# Patient Record
Sex: MALE | Race: WHITE | Employment: OTHER | URBAN - METROPOLITAN AREA
[De-identification: names, ages, dates, MRNs, and addresses within clinical notes are randomized per-mention and may not be internally consistent; named-entity substitution may affect disease eponyms.]

---

## 2021-03-15 DIAGNOSIS — Z23 NEED FOR VACCINATION: ICD-10-CM

## 2023-06-28 ENCOUNTER — OFFICE VISIT (OUTPATIENT)
Dept: URGENT CARE | Facility: PHYSICIAN GROUP | Age: 63
End: 2023-06-28
Payer: COMMERCIAL

## 2023-06-28 VITALS
SYSTOLIC BLOOD PRESSURE: 140 MMHG | HEART RATE: 98 BPM | TEMPERATURE: 98.2 F | RESPIRATION RATE: 16 BRPM | OXYGEN SATURATION: 96 % | HEIGHT: 71 IN | WEIGHT: 208 LBS | BODY MASS INDEX: 29.12 KG/M2 | DIASTOLIC BLOOD PRESSURE: 100 MMHG

## 2023-06-28 DIAGNOSIS — I11.9 BENIGN HYPERTENSIVE HEART DISEASE WITHOUT HEART FAILURE: ICD-10-CM

## 2023-06-28 DIAGNOSIS — Z86.79 HISTORY OF ATRIAL FIBRILLATION: ICD-10-CM

## 2023-06-28 DIAGNOSIS — J45.20 INTERMITTENT ASTHMA WITHOUT COMPLICATION, UNSPECIFIED ASTHMA SEVERITY: ICD-10-CM

## 2023-06-28 PROCEDURE — 3077F SYST BP >= 140 MM HG: CPT

## 2023-06-28 PROCEDURE — 93000 ELECTROCARDIOGRAM COMPLETE: CPT

## 2023-06-28 PROCEDURE — 99214 OFFICE O/P EST MOD 30 MIN: CPT

## 2023-06-28 PROCEDURE — 3080F DIAST BP >= 90 MM HG: CPT

## 2023-06-28 RX ORDER — METOPROLOL SUCCINATE 100 MG/1
100 TABLET, EXTENDED RELEASE ORAL DAILY
Qty: 30 TABLET | Refills: 0 | Status: SHIPPED | OUTPATIENT
Start: 2023-06-28 | End: 2023-07-05

## 2023-06-28 RX ORDER — ALBUTEROL SULFATE 90 UG/1
2 AEROSOL, METERED RESPIRATORY (INHALATION) EVERY 6 HOURS PRN
Qty: 8.5 G | Refills: 0 | Status: SHIPPED | OUTPATIENT
Start: 2023-06-28

## 2023-06-28 ASSESSMENT — ENCOUNTER SYMPTOMS
COUGH: 0
WEIGHT LOSS: 0
DIAPHORESIS: 0
PND: 0
FEVER: 0
ORTHOPNEA: 0
CLAUDICATION: 0
PALPITATIONS: 0
CHILLS: 0

## 2023-06-28 NOTE — PATIENT INSTRUCTIONS
Follow up with PCP in 2 weeks  Daily AM and PM blood pressure and heart rate monitoring  Blood pressure and heart rate log  Bring log to PCP

## 2023-06-28 NOTE — PROGRESS NOTES
"Subjective:     Eitan Braswell is a 63 y.o. male who presents for Medication Refill (Need blood pressure refill)    HPI    The patient  endorses that he went to the dentist for tooth extractions and was told that his blood pressure was too high to have the procedure. He has a past medical history of hypertension and atrial fibrillation. He had a PCP was was managing his hypertension, but endorses that he has not seen a PCP in over 18 months and has been non-compliant with is BP medications. He presents to the urgent care for BP medication refill. Pertinent negatives include no dizziness, lightheadedness, SOB, orthopnea, palpitations, PND, cough, claudication, leg swelling, chest pain, headache, changes in vision.  He has an appointment with a new primary care provider on July 5.      Review of Systems   Constitutional:  Negative for chills, diaphoresis, fever, malaise/fatigue and weight loss.   Respiratory:  Negative for cough.    Cardiovascular:  Negative for chest pain, palpitations, orthopnea, claudication, leg swelling and PND.   All other systems reviewed and are negative.      PMH:   Past Medical History:   Diagnosis Date    ASTHMA     Atrial fibrillation (HCC) 4/4/2012    Benign hypertensive heart disease without heart failure 4/4/2012    Chest pain, unspecified 4/4/2012    Congestive heart failure (HCC)     afib, mi    Hypertension      ALLERGIES: No Known Allergies  SURGHX:   Past Surgical History:   Procedure Laterality Date    OTHER ORTHOPEDIC SURGERY      shoulder     SOCHX:   Social History     Socioeconomic History    Marital status:    Tobacco Use    Smoking status: Former   Substance and Sexual Activity    Alcohol use: No    Drug use: No     FH: History reviewed. No pertinent family history.      Objective:   BP (!) 140/100   Pulse 98   Temp 36.8 °C (98.2 °F) (Temporal)   Resp 16   Ht 1.803 m (5' 11\")   Wt 94.3 kg (208 lb)   SpO2 96%   BMI 29.01 kg/m²     Physical Exam  Vitals " reviewed.   Constitutional:       General: He is not in acute distress.     Appearance: Normal appearance. He is normal weight. He is not ill-appearing, toxic-appearing or diaphoretic.   HENT:      Head: Normocephalic and atraumatic.      Right Ear: Tympanic membrane, ear canal and external ear normal.      Left Ear: Tympanic membrane, ear canal and external ear normal.      Nose: Nose normal.      Mouth/Throat:      Mouth: Mucous membranes are moist.      Pharynx: Oropharynx is clear.   Eyes:      Extraocular Movements: Extraocular movements intact.      Conjunctiva/sclera: Conjunctivae normal.      Pupils: Pupils are equal, round, and reactive to light.   Cardiovascular:      Rate and Rhythm: Regular rhythm. Tachycardia present. Extrasystoles are present.     Chest Wall: PMI is not displaced. No thrill.      Pulses: Normal pulses. No decreased pulses.      Heart sounds: Normal heart sounds. Heart sounds not distant. No murmur heard.     No systolic murmur is present.      No diastolic murmur is present.      No friction rub. No gallop. No S3 or S4 sounds.   Pulmonary:      Effort: Pulmonary effort is normal. No respiratory distress.      Breath sounds: Normal breath sounds. No stridor. No wheezing, rhonchi or rales.   Abdominal:      General: Abdomen is flat. Bowel sounds are normal.      Palpations: Abdomen is soft.   Musculoskeletal:         General: Normal range of motion.      Cervical back: Normal range of motion and neck supple.      Right lower leg: No edema.      Left lower leg: No edema.   Skin:     General: Skin is warm and dry.   Neurological:      General: No focal deficit present.      Mental Status: He is alert and oriented to person, place, and time. Mental status is at baseline.   Psychiatric:         Mood and Affect: Mood normal.         Behavior: Behavior normal.         Thought Content: Thought content normal.         Judgment: Judgment normal.       EKG: NSR, ST rate: 105 , normal axis, normal  intervals, no evidence of new onset ischemia, possible LA enlargement     SEE SCANNED MEDIA    Assessment/Plan:   Assessment      1. Benign hypertensive heart disease without heart failure  - metoprolol SR (TOPROL XL) 100 MG TABLET SR 24 HR; Take 1 Tablet by mouth every day for 30 days. PLEASE SCHEDULE APPOINTMENT FOR FURTHER REFILLS.  Dispense: 30 Tablet; Refill: 0    2. History of atrial fibrillation  - EKG - Clinic Performed  - Referral to establish with [facility] PCP    3. Intermittent asthma without complication, unspecified asthma severity  - albuterol 108 (90 Base) MCG/ACT Aero Soln inhalation aerosol; Inhale 2 Puffs every 6 hours as needed for Shortness of Breath.  Dispense: 8.5 g; Refill: 0     EKG completed in clinic which demonstrated a normal sinus rhythm with a heart rate of 105.  Probable old infarct, which the patient endorses he has known about for many years.  Patient asymptomatic at this time. The patient is not in atrial fibrillation at the time of clinical encounter. Prescription for metoprolol refilled and sent to patient's preferred pharmacy.  Patient educated on hypertension management, including lifestyle modifications, regular healthcare maintenance, and medication compliance.  He will start a blood pressure and heart rate log prior to taking his BP medication and was educated to record a.m. and p.m. readings.  He will follow-up with his primary care provider on July 5, 2023.  ER precautions discussed in the event that patient has an episode of atrial fibrillation as he reports he experiences palpitations when he converts into this rhythm.  He is not anticoagulated nor does he take a low-dose baby aspirin daily.    Albuterol inhaler refilled. All questions answered. Patient verbalized understanding and is in agreement with this plan of care.     If symptoms are worsening or not improving in 3-5 days, follow-up with PCP or return to . Differential diagnosis, natural history, and supportive  care discussed. AVS handout given and reviewed with patient. Patient educated on red flags and when to seek treatment back in ED or UC.     I personally reviewed prior external notes and test results pertinent to today's visit.  I have independently reviewed and interpreted all diagnostics ordered during this urgent care visit.     This dictation has been created using voice recognition software. The accuracy of the dictation is limited by the abilities of the software. I expect there may be some errors of grammar and possibly content. I made every attempt to manually correct the errors within my dictation. However, errors related to voice recognition software may still exist and should be interpreted within the appropriate context.    This note was electronically signed by GASPER Rhoades

## 2023-07-02 SDOH — HEALTH STABILITY: PHYSICAL HEALTH: ON AVERAGE, HOW MANY DAYS PER WEEK DO YOU ENGAGE IN MODERATE TO STRENUOUS EXERCISE (LIKE A BRISK WALK)?: 4 DAYS

## 2023-07-02 SDOH — ECONOMIC STABILITY: TRANSPORTATION INSECURITY
IN THE PAST 12 MONTHS, HAS THE LACK OF TRANSPORTATION KEPT YOU FROM MEDICAL APPOINTMENTS OR FROM GETTING MEDICATIONS?: NO

## 2023-07-02 SDOH — ECONOMIC STABILITY: INCOME INSECURITY: IN THE LAST 12 MONTHS, WAS THERE A TIME WHEN YOU WERE NOT ABLE TO PAY THE MORTGAGE OR RENT ON TIME?: NO

## 2023-07-02 SDOH — ECONOMIC STABILITY: HOUSING INSECURITY: IN THE LAST 12 MONTHS, HOW MANY PLACES HAVE YOU LIVED?: 1

## 2023-07-02 SDOH — ECONOMIC STABILITY: FOOD INSECURITY: WITHIN THE PAST 12 MONTHS, YOU WORRIED THAT YOUR FOOD WOULD RUN OUT BEFORE YOU GOT MONEY TO BUY MORE.: NEVER TRUE

## 2023-07-02 SDOH — ECONOMIC STABILITY: TRANSPORTATION INSECURITY
IN THE PAST 12 MONTHS, HAS LACK OF TRANSPORTATION KEPT YOU FROM MEETINGS, WORK, OR FROM GETTING THINGS NEEDED FOR DAILY LIVING?: NO

## 2023-07-02 SDOH — ECONOMIC STABILITY: HOUSING INSECURITY
IN THE LAST 12 MONTHS, WAS THERE A TIME WHEN YOU DID NOT HAVE A STEADY PLACE TO SLEEP OR SLEPT IN A SHELTER (INCLUDING NOW)?: NO

## 2023-07-02 SDOH — HEALTH STABILITY: MENTAL HEALTH
STRESS IS WHEN SOMEONE FEELS TENSE, NERVOUS, ANXIOUS, OR CAN'T SLEEP AT NIGHT BECAUSE THEIR MIND IS TROUBLED. HOW STRESSED ARE YOU?: NOT AT ALL

## 2023-07-02 SDOH — ECONOMIC STABILITY: TRANSPORTATION INSECURITY
IN THE PAST 12 MONTHS, HAS LACK OF RELIABLE TRANSPORTATION KEPT YOU FROM MEDICAL APPOINTMENTS, MEETINGS, WORK OR FROM GETTING THINGS NEEDED FOR DAILY LIVING?: NO

## 2023-07-02 SDOH — HEALTH STABILITY: PHYSICAL HEALTH: ON AVERAGE, HOW MANY MINUTES DO YOU ENGAGE IN EXERCISE AT THIS LEVEL?: 60 MIN

## 2023-07-02 SDOH — ECONOMIC STABILITY: INCOME INSECURITY: HOW HARD IS IT FOR YOU TO PAY FOR THE VERY BASICS LIKE FOOD, HOUSING, MEDICAL CARE, AND HEATING?: NOT HARD AT ALL

## 2023-07-02 SDOH — ECONOMIC STABILITY: FOOD INSECURITY: WITHIN THE PAST 12 MONTHS, THE FOOD YOU BOUGHT JUST DIDN'T LAST AND YOU DIDN'T HAVE MONEY TO GET MORE.: NEVER TRUE

## 2023-07-02 ASSESSMENT — SOCIAL DETERMINANTS OF HEALTH (SDOH)
DO YOU BELONG TO ANY CLUBS OR ORGANIZATIONS SUCH AS CHURCH GROUPS UNIONS, FRATERNAL OR ATHLETIC GROUPS, OR SCHOOL GROUPS?: NO
HOW OFTEN DO YOU HAVE A DRINK CONTAINING ALCOHOL: NEVER
HOW OFTEN DO YOU ATTENT MEETINGS OF THE CLUB OR ORGANIZATION YOU BELONG TO?: NEVER
DO YOU BELONG TO ANY CLUBS OR ORGANIZATIONS SUCH AS CHURCH GROUPS UNIONS, FRATERNAL OR ATHLETIC GROUPS, OR SCHOOL GROUPS?: NO
HOW MANY DRINKS CONTAINING ALCOHOL DO YOU HAVE ON A TYPICAL DAY WHEN YOU ARE DRINKING: PATIENT DOES NOT DRINK
HOW OFTEN DO YOU GET TOGETHER WITH FRIENDS OR RELATIVES?: ONCE A WEEK
HOW HARD IS IT FOR YOU TO PAY FOR THE VERY BASICS LIKE FOOD, HOUSING, MEDICAL CARE, AND HEATING?: NOT HARD AT ALL
HOW OFTEN DO YOU ATTEND CHURCH OR RELIGIOUS SERVICES?: NEVER
HOW OFTEN DO YOU GET TOGETHER WITH FRIENDS OR RELATIVES?: ONCE A WEEK
IN A TYPICAL WEEK, HOW MANY TIMES DO YOU TALK ON THE PHONE WITH FAMILY, FRIENDS, OR NEIGHBORS?: THREE TIMES A WEEK
HOW OFTEN DO YOU ATTEND CHURCH OR RELIGIOUS SERVICES?: NEVER
HOW OFTEN DO YOU HAVE SIX OR MORE DRINKS ON ONE OCCASION: NEVER
HOW OFTEN DO YOU ATTENT MEETINGS OF THE CLUB OR ORGANIZATION YOU BELONG TO?: NEVER
IN A TYPICAL WEEK, HOW MANY TIMES DO YOU TALK ON THE PHONE WITH FAMILY, FRIENDS, OR NEIGHBORS?: THREE TIMES A WEEK
WITHIN THE PAST 12 MONTHS, YOU WORRIED THAT YOUR FOOD WOULD RUN OUT BEFORE YOU GOT THE MONEY TO BUY MORE: NEVER TRUE

## 2023-07-02 ASSESSMENT — LIFESTYLE VARIABLES
SKIP TO QUESTIONS 9-10: 1
AUDIT-C TOTAL SCORE: 0
HOW OFTEN DO YOU HAVE A DRINK CONTAINING ALCOHOL: NEVER
HOW OFTEN DO YOU HAVE SIX OR MORE DRINKS ON ONE OCCASION: NEVER
HOW MANY STANDARD DRINKS CONTAINING ALCOHOL DO YOU HAVE ON A TYPICAL DAY: PATIENT DOES NOT DRINK

## 2023-07-05 ENCOUNTER — OFFICE VISIT (OUTPATIENT)
Dept: MEDICAL GROUP | Facility: PHYSICIAN GROUP | Age: 63
End: 2023-07-05
Payer: COMMERCIAL

## 2023-07-05 VITALS
HEART RATE: 78 BPM | DIASTOLIC BLOOD PRESSURE: 76 MMHG | WEIGHT: 246 LBS | OXYGEN SATURATION: 94 % | TEMPERATURE: 97.3 F | RESPIRATION RATE: 16 BRPM | SYSTOLIC BLOOD PRESSURE: 168 MMHG | HEIGHT: 71 IN | BODY MASS INDEX: 34.44 KG/M2

## 2023-07-05 DIAGNOSIS — I48.20 CHRONIC ATRIAL FIBRILLATION (HCC): ICD-10-CM

## 2023-07-05 DIAGNOSIS — Z11.59 NEED FOR HEPATITIS C SCREENING TEST: ICD-10-CM

## 2023-07-05 DIAGNOSIS — E66.9 OBESITY (BMI 30-39.9): ICD-10-CM

## 2023-07-05 DIAGNOSIS — Z12.2 ENCOUNTER FOR SCREENING FOR LUNG CANCER: ICD-10-CM

## 2023-07-05 DIAGNOSIS — Z12.5 PROSTATE CANCER SCREENING: ICD-10-CM

## 2023-07-05 DIAGNOSIS — Z13.228 SCREENING FOR METABOLIC DISORDER: ICD-10-CM

## 2023-07-05 DIAGNOSIS — J45.20 MILD INTERMITTENT ASTHMA WITHOUT COMPLICATION: ICD-10-CM

## 2023-07-05 DIAGNOSIS — I10 PRIMARY HYPERTENSION: ICD-10-CM

## 2023-07-05 PROCEDURE — 99214 OFFICE O/P EST MOD 30 MIN: CPT | Performed by: NURSE PRACTITIONER

## 2023-07-05 PROCEDURE — 3077F SYST BP >= 140 MM HG: CPT | Performed by: NURSE PRACTITIONER

## 2023-07-05 PROCEDURE — 3078F DIAST BP <80 MM HG: CPT | Performed by: NURSE PRACTITIONER

## 2023-07-05 RX ORDER — CARVEDILOL 12.5 MG/1
12.5 TABLET ORAL 2 TIMES DAILY WITH MEALS
Qty: 60 TABLET | Refills: 0 | Status: SHIPPED | OUTPATIENT
Start: 2023-07-05 | End: 2023-07-13 | Stop reason: SDUPTHER

## 2023-07-05 ASSESSMENT — PATIENT HEALTH QUESTIONNAIRE - PHQ9: CLINICAL INTERPRETATION OF PHQ2 SCORE: 0

## 2023-07-05 ASSESSMENT — ENCOUNTER SYMPTOMS
SHORTNESS OF BREATH: 0
COUGH: 0
PALPITATIONS: 0
MUSCULOSKELETAL NEGATIVE: 1
EYES NEGATIVE: 1
FEVER: 0
GASTROINTESTINAL NEGATIVE: 1
SPUTUM PRODUCTION: 0
NEUROLOGICAL NEGATIVE: 1
CONSTITUTIONAL NEGATIVE: 1
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
## 2023-07-06 NOTE — ASSESSMENT & PLAN NOTE
States was told he had afib with hypertension years ago in his early 50s; had been on Coreg 12.5mg BID for years. Ran out of insurance and medication in 2022. Was restarted on metoprolol XL 100mg QD in  on 6/28/2023; BP has remained high so we will switch him back to Coreg; get records from outside PCP;   reviewed old ER notes from 2007; there is mention of him being admitted for asthma exacerbation and going into afib during that hospitalization. He converted back with IV cardizem;   echo in 2007- EF > 55%; Normal left ventricle systolic function.  Basilar septal and inferior hypokinesis.   2. Trace mitral regurgitation.   3. Trace pulmonic insufficiency.   4. Aortic valve sclerosis with no significant aortic insufficiency or aortic stenosis.   5. No evidence of pericardial effusion.  Also had NM stress test in 2007 which was normal.    EKG done in  on 6/18/2023 shows him in NSR with left atrial enlargement    - start Coreg 12.5mg BID  - overdue for labs, ordered today
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
## 2023-07-06 NOTE — ASSESSMENT & PLAN NOTE
Chronic since age early 50s; also with hx of afib and had been managed on Coreg 12.5mg BID for years. Lost insurance in 2022 and ran out of medication. He is noticing exertional dyspnea and told at dental office a few weeks ago BP elevated. Seen in our UC on 6/28/2023 for this; EKG was NSR with left atrial enlargement. Was started on Metoprolol at . No change per patient, SBP at home 150s.  BP in clinic today is 168/76; denies CP, palpitations, edema. He gets SOB when he runs or does strenous activity. Denies snoring. Recalls using inhaler as a child; needed albuterol 4x in February with smoke exposure. 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   - d/c metoprolol  - restart Coreg 12.5mg BID; can take QD and increase in a few days  - reassess pressure next week; increase or add 2nd agent if needed to meet goal BP < 130/80  - over due for annual labs; ordered today

## 2023-07-13 ENCOUNTER — OFFICE VISIT (OUTPATIENT)
Dept: MEDICAL GROUP | Facility: PHYSICIAN GROUP | Age: 63
End: 2023-07-13
Payer: COMMERCIAL

## 2023-07-13 VITALS
WEIGHT: 249 LBS | HEART RATE: 84 BPM | OXYGEN SATURATION: 93 % | SYSTOLIC BLOOD PRESSURE: 124 MMHG | HEIGHT: 71 IN | RESPIRATION RATE: 16 BRPM | DIASTOLIC BLOOD PRESSURE: 78 MMHG | TEMPERATURE: 97 F | BODY MASS INDEX: 34.86 KG/M2

## 2023-07-13 DIAGNOSIS — I48.20 CHRONIC ATRIAL FIBRILLATION (HCC): ICD-10-CM

## 2023-07-13 DIAGNOSIS — I10 PRIMARY HYPERTENSION: ICD-10-CM

## 2023-07-13 PROCEDURE — 3078F DIAST BP <80 MM HG: CPT | Performed by: NURSE PRACTITIONER

## 2023-07-13 PROCEDURE — 99214 OFFICE O/P EST MOD 30 MIN: CPT | Performed by: NURSE PRACTITIONER

## 2023-07-13 PROCEDURE — 3074F SYST BP LT 130 MM HG: CPT | Performed by: NURSE PRACTITIONER

## 2023-07-13 RX ORDER — CARVEDILOL 12.5 MG/1
12.5 TABLET ORAL 2 TIMES DAILY WITH MEALS
Qty: 180 TABLET | Refills: 3 | Status: SHIPPED | OUTPATIENT
Start: 2023-07-13

## 2023-07-13 ASSESSMENT — ENCOUNTER SYMPTOMS
COUGH: 0
EYES NEGATIVE: 1
PALPITATIONS: 0
SHORTNESS OF BREATH: 0
FEVER: 0
GASTROINTESTINAL NEGATIVE: 1
CONSTITUTIONAL NEGATIVE: 1
MUSCULOSKELETAL NEGATIVE: 1
NEUROLOGICAL NEGATIVE: 1
SPUTUM PRODUCTION: 0

## 2023-07-13 NOTE — ASSESSMENT & PLAN NOTE
Chronic since age early 50s; also with hx of afib and had been managed on Coreg 12.5mg BID for years. Lost insurance in 2022 and ran out of medication. He was noticing exertional dyspnea and told at dental office a few weeks ago BP elevated. Seen in our UC on 6/28/2023 for this; EKG was NSR with left atrial enlargement. Was started on Metoprolol at , BP remained elevated at home and we switched him back to Coreg at our last visit on 7/5/2023.  Tolerating well. BP in clinic today is 1124/78; SBP at home in 120s. denies CP, palpitations, edema. He gets SOB when he runs or does strenous activity. Denies snoring. Recalls using inhaler as a child; needed albuterol 4x in February with smoke exposure. 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 Referred to for LDCT, got declined due to quit smoking > 15 years  - continue Coreg 12.5mg BID  - keep weekly BP log at home; bring in for review at next visit  - over due for annual labs; ordered at last visit; he will get these done in a few months after he completes dental work (high co-pay)

## 2023-07-13 NOTE — PROGRESS NOTES
Subjective       CC:   Chief Complaint   Patient presents with    Medication Follow-up    Hypertension Follow-up     Blood pressure going down, medication working with no side effects        HPI:   Patient is a 63 y.o. established male patient with medical history listed below here today for evaluation and management of hypertension. He established with me on 7/5/2023, we switched him from metoprolol back to Coreg at our last visit. Tolerating well. He will get labs done in a few months, high co-pay and he wants to complete dental work.     Problem   Atrial Fibrillation (Hcc)   Primary Hypertension       Patient Active Problem List   Diagnosis    Atrial fibrillation (HCC)    Primary hypertension    Mild intermittent asthma without complication    Obesity (BMI 30-39.9)       Past Medical History:   Diagnosis Date    ASTHMA     Atrial fibrillation (HCC) 4/4/2012    Benign hypertensive heart disease without heart failure 4/4/2012    Chest pain, unspecified 4/4/2012    Congestive heart failure (HCC)     afib, mi    Hypertension         Past Surgical History:   Procedure Laterality Date    OTHER ORTHOPEDIC SURGERY      shoulder        Current Outpatient Medications on File Prior to Visit   Medication Sig Dispense Refill    albuterol 108 (90 Base) MCG/ACT Aero Soln inhalation aerosol Inhale 2 Puffs every 6 hours as needed for Shortness of Breath. 8.5 g 0    albuterol (PROVENTIL) 90 MCG/ACT AERS Inhale 2 Puffs by mouth every 6 hours as needed for Shortness of Breath. 1 Inhaler 0    ADVAIR HFA INH        No current facility-administered medications on file prior to visit.        Health Maintenance: Completed    ROS:  Review of Systems   Constitutional: Negative.  Negative for fever and malaise/fatigue.   HENT: Negative.     Eyes: Negative.    Respiratory:  Negative for cough, sputum production and shortness of breath.    Cardiovascular:  Negative for chest pain, palpitations and leg swelling.   Gastrointestinal: Negative.   "  Genitourinary: Negative.    Musculoskeletal: Negative.    Neurological: Negative.    Endo/Heme/Allergies: Negative.        Objective       Exam:  /78   Pulse 84   Temp 36.1 °C (97 °F) (Temporal)   Resp 16   Ht 1.803 m (5' 11\")   Wt 113 kg (249 lb)   SpO2 93%   BMI 34.73 kg/m²  Body mass index is 34.73 kg/m².    Physical Exam  Constitutional:       Appearance: Normal appearance. He is obese.   Cardiovascular:      Rate and Rhythm: Normal rate and regular rhythm.      Pulses: Normal pulses.      Heart sounds: Normal heart sounds.   Pulmonary:      Effort: Pulmonary effort is normal.      Breath sounds: Normal breath sounds.   Musculoskeletal:         General: Normal range of motion.      Cervical back: Normal range of motion and neck supple.      Right lower leg: No edema.      Left lower leg: No edema.   Skin:     General: Skin is warm and dry.   Neurological:      Mental Status: He is alert.       Assessment & Plan       63 y.o. male with the following -     Problem List Items Addressed This Visit       Atrial fibrillation (HCC)     HPI - [facility] was told he had afib with hypertension years ago in his early 50s; had been on Coreg 12.5mg BID for years. Ran out of insurance and medication in 2022. Was restarted on metoprolol XL 100mg QD in  on 6/28/2023; BP remained high so we switched him back to Coreg on 7/5/2023;   Records from outside PCP requested; reviewed old ER notes from 2007; there is mention of him being admitted for asthma exacerbation and going into afib during that hospitalization. He converted back with IV cardizem;   echo in 2007- EF > 55%; Normal left ventricle systolic function.  Basilar septal and inferior hypokinesis.   2. Trace mitral regurgitation.   3. Trace pulmonic insufficiency.   4. Aortic valve sclerosis with no significant aortic insufficiency or aortic stenosis.   5. No evidence of pericardial effusion.  Also had NM stress test in 2007 which was normal.    EKG done in  " on 6/18/2023 shows him in NSR with left atrial enlargement    - continue Coreg 12.5mg BID; tolerating better; /78, HR 84  - overdue for labs, ordered; he will get these done once able to cover co-pay           Relevant Medications    carvedilol (COREG) 12.5 MG Tab    Primary hypertension     Chronic since age early 50s; also with hx of afib and had been managed on Coreg 12.5mg BID for years. Lost insurance in 2022 and ran out of medication. He was noticing exertional dyspnea and told at dental office a few weeks ago BP elevated. Seen in our UC on 6/28/2023 for this; EKG was NSR with left atrial enlargement. Was started on Metoprolol at , BP remained elevated at home and we switched him back to Coreg at our last visit on 7/5/2023.  Tolerating well. BP in clinic today is 1124/78; SBP at home in 120s. denies CP, palpitations, edema. He gets SOB when he runs or does strenous activity. Denies snoring. Recalls using inhaler as a child; needed albuterol 4x in February with smoke exposure. 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 Referred to for LDCT, got declined due to quit smoking > 15 years  - continue Coreg 12.5mg BID  - keep weekly BP log at home; bring in for review at next visit  - over due for annual labs; ordered at last visit; he will get these done in a few months after he completes dental work (high co-pay)         Relevant Medications    carvedilol (COREG) 12.5 MG Tab     Educated in proper administration of medication(s) ordered today including safety, possible SE, risks, benefits, rationale and alternatives to therapy.     Return in about 3 months (around 10/13/2023) for HTN, lab review.    Please note that this dictation was created using voice recognition software. I have made every reasonable attempt to correct obvious errors, but I expect that there are errors of grammar and possibly content that I did not discover before finalizing the note.

## 2023-07-13 NOTE — ASSESSMENT & PLAN NOTE
[facility] was told he had afib with hypertension years ago in his early 50s; had been on Coreg 12.5mg BID for years. Ran out of insurance and medication in 2022. Was restarted on metoprolol XL 100mg QD in  on 6/28/2023; BP remained high so we switched him back to Coreg on 7/5/2023;   Records from outside PCP requested; reviewed old ER notes from 2007; there is mention of him being admitted for asthma exacerbation and going into afib during that hospitalization. He converted back with IV cardizem;   echo in 2007- EF > 55%; Normal left ventricle systolic function.  Basilar septal and inferior hypokinesis.   2. Trace mitral regurgitation.   3. Trace pulmonic insufficiency.   4. Aortic valve sclerosis with no significant aortic insufficiency or aortic stenosis.   5. No evidence of pericardial effusion.  Also had NM stress test in 2007 which was normal.    EKG done in  on 6/18/2023 shows him in NSR with left atrial enlargement    - continue Coreg 12.5mg BID; tolerating better; /78, HR 84  - overdue for labs, ordered; he will get these done once able to cover co-pay

## 2023-10-13 ENCOUNTER — OFFICE VISIT (OUTPATIENT)
Dept: MEDICAL GROUP | Facility: PHYSICIAN GROUP | Age: 63
End: 2023-10-13
Payer: COMMERCIAL

## 2023-10-13 VITALS
SYSTOLIC BLOOD PRESSURE: 118 MMHG | RESPIRATION RATE: 16 BRPM | TEMPERATURE: 98 F | HEIGHT: 71 IN | OXYGEN SATURATION: 96 % | BODY MASS INDEX: 34.44 KG/M2 | DIASTOLIC BLOOD PRESSURE: 62 MMHG | HEART RATE: 71 BPM | WEIGHT: 246 LBS

## 2023-10-13 DIAGNOSIS — Z23 NEED FOR VACCINATION: ICD-10-CM

## 2023-10-13 DIAGNOSIS — I10 PRIMARY HYPERTENSION: ICD-10-CM

## 2023-10-13 PROCEDURE — 90471 IMMUNIZATION ADMIN: CPT | Performed by: NURSE PRACTITIONER

## 2023-10-13 PROCEDURE — 99213 OFFICE O/P EST LOW 20 MIN: CPT | Mod: 25 | Performed by: NURSE PRACTITIONER

## 2023-10-13 PROCEDURE — 3074F SYST BP LT 130 MM HG: CPT | Performed by: NURSE PRACTITIONER

## 2023-10-13 PROCEDURE — 3078F DIAST BP <80 MM HG: CPT | Performed by: NURSE PRACTITIONER

## 2023-10-13 PROCEDURE — 90662 IIV NO PRSV INCREASED AG IM: CPT | Performed by: NURSE PRACTITIONER

## 2023-10-13 RX ORDER — AMOXICILLIN 500 MG/1
CAPSULE ORAL
COMMUNITY
Start: 2023-07-27 | End: 2023-10-13

## 2023-10-13 ASSESSMENT — ENCOUNTER SYMPTOMS
SPUTUM PRODUCTION: 0
CONSTITUTIONAL NEGATIVE: 1
COUGH: 0
PALPITATIONS: 0
SHORTNESS OF BREATH: 0
NEUROLOGICAL NEGATIVE: 1
FEVER: 0
GASTROINTESTINAL NEGATIVE: 1
MUSCULOSKELETAL NEGATIVE: 1
EYES NEGATIVE: 1

## 2023-10-13 NOTE — ASSESSMENT & PLAN NOTE
Had referred him to lung cancer screening, not eligible for program enrollment, Pt stated he quit in 1991 (32 yrs prior) which is out side the program criteria of no more than 15 yrs since smoking cessation.

## 2023-10-13 NOTE — PROGRESS NOTES
Subjective       CC:   Chief Complaint   Patient presents with    Hypertension Follow-up        HPI:   Patient is a 63 y.o. established male patient with medical history listed below here today for evaluation and management of hypertension. He is doing well today, no new concerns. We switched him back to BID coreg (from metoprolol) in 7/2023; tolerating well.      Had referred him to lung cancer screening, not eligible for program enrollment, Pt stated he quit in 1991 (32 yrs prior) which is out side the program criteria of no more than 15 yrs since smoking cessation.    Problem   Mild Intermittent Asthma Without Complication   Primary Hypertension    Taking Coreg 12.5mg BID         Patient Active Problem List   Diagnosis    Atrial fibrillation (HCC)    Primary hypertension    Mild intermittent asthma without complication    Obesity (BMI 30-39.9)       Past Medical History:   Diagnosis Date    ASTHMA     Atrial fibrillation (HCC) 4/4/2012    Benign hypertensive heart disease without heart failure 4/4/2012    Chest pain, unspecified 4/4/2012    Congestive heart failure (HCC)     afib, mi    Hypertension         Past Surgical History:   Procedure Laterality Date    OTHER ORTHOPEDIC SURGERY      shoulder        Current Outpatient Medications on File Prior to Visit   Medication Sig Dispense Refill    carvedilol (COREG) 12.5 MG Tab Take 1 Tablet by mouth 2 times a day with meals. 180 Tablet 3    albuterol 108 (90 Base) MCG/ACT Aero Soln inhalation aerosol Inhale 2 Puffs every 6 hours as needed for Shortness of Breath. 8.5 g 0    albuterol (PROVENTIL) 90 MCG/ACT AERS Inhale 2 Puffs by mouth every 6 hours as needed for Shortness of Breath. 1 Inhaler 0    ADVAIR HFA INH        No current facility-administered medications on file prior to visit.        Health Maintenance: Completed  - flu vaccine today    ROS:  Review of Systems   Constitutional: Negative.  Negative for fever and malaise/fatigue.   HENT: Negative.     Eyes:  "Negative.    Respiratory:  Negative for cough, sputum production and shortness of breath.    Cardiovascular:  Negative for chest pain, palpitations and leg swelling.   Gastrointestinal: Negative.    Genitourinary: Negative.    Musculoskeletal: Negative.    Neurological: Negative.    Endo/Heme/Allergies: Negative.      Objective       Exam:  /62   Pulse 71   Temp 36.7 °C (98 °F) (Oral)   Resp 16   Ht 1.803 m (5' 11\")   Wt 112 kg (246 lb)   SpO2 96%   BMI 34.31 kg/m²  Body mass index is 34.31 kg/m².    Physical Exam  Constitutional:       Appearance: Normal appearance.   Cardiovascular:      Rate and Rhythm: Normal rate and regular rhythm.      Pulses: Normal pulses.      Heart sounds: Normal heart sounds.   Pulmonary:      Effort: Pulmonary effort is normal.      Breath sounds: Normal breath sounds.   Musculoskeletal:      Right lower leg: No edema.      Left lower leg: No edema.   Skin:     General: Skin is warm and dry.   Neurological:      Mental Status: He is alert.         Assessment & Plan       63 y.o. male with the following -     Problem List Items Addressed This Visit       Primary hypertension     Chronic since age early 50s; also with hx of afib and had been managed on Coreg 12.5mg BID for years. Lost insurance in 2022 and ran out of medication. He was noticing exertional dyspnea and told at dental office a few weeks ago BP elevated. Seen in our  on 6/28/2023 for this; EKG was NSR with left atrial enlargement. Was started on Metoprolol at , BP remained elevated at home and we switched him back to Coreg at our last visit on 7/5/2023.  Tolerating well. BP in clinic today is 118/62; SBP at home in 120s. denies CP, palpitations, edema. He gets SOB when he runs or does strenous activity. Denies snoring. Recalls using inhaler as a child; needed albuterol 4x in February with smoke exposure. 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 Referred to for LDCT, got declined due to quit " smoking > 15 years  - continue Coreg 12.5mg BID  - keep weekly BP log at home; bring in for review at next visit  - over due for annual labs; ordered at last visit; he will get these done in a few months after he completes dental work (high co-pay)          Other Visit Diagnoses       Need for vaccination        I have placed the below orders and discussed them with an approved delegating provider.The MA is performing the below orders under the direction of Dr Howell.    Relevant Orders    Influenza Vaccine, High Dose (65+ Only) (Completed)          Educated in proper administration of medication(s) ordered today including safety, possible SE, risks, benefits, rationale and alternatives to therapy.     Return in about 4 months (around 2/13/2024).    Please note that this dictation was created using voice recognition software. I have made every reasonable attempt to correct obvious errors, but I expect that there are errors of grammar and possibly content that I did not discover before finalizing the note.

## 2023-10-13 NOTE — ASSESSMENT & PLAN NOTE
Chronic since age early 50s; also with hx of afib and had been managed on Coreg 12.5mg BID for years. Lost insurance in 2022 and ran out of medication. He was noticing exertional dyspnea and told at dental office a few weeks ago BP elevated. Seen in our UC on 6/28/2023 for this; EKG was NSR with left atrial enlargement. Was started on Metoprolol at , BP remained elevated at home and we switched him back to Coreg at our last visit on 7/5/2023.  Tolerating well. BP in clinic today is 118/62; SBP at home in 120s. denies CP, palpitations, edema. He gets SOB when he runs or does strenous activity. Denies snoring. Recalls using inhaler as a child; needed albuterol 4x in February with smoke exposure. 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 Referred to for LDCT, got declined due to quit smoking > 15 years  - continue Coreg 12.5mg BID  - keep weekly BP log at home; bring in for review at next visit  - over due for annual labs; ordered at last visit; he will get these done in a few months after he completes dental work (high co-pay)

## 2024-02-21 ENCOUNTER — OFFICE VISIT (OUTPATIENT)
Dept: MEDICAL GROUP | Facility: PHYSICIAN GROUP | Age: 64
End: 2024-02-21
Payer: MEDICARE

## 2024-02-21 VITALS
BODY MASS INDEX: 33.6 KG/M2 | SYSTOLIC BLOOD PRESSURE: 120 MMHG | RESPIRATION RATE: 16 BRPM | DIASTOLIC BLOOD PRESSURE: 82 MMHG | TEMPERATURE: 97.8 F | HEIGHT: 71 IN | OXYGEN SATURATION: 97 % | WEIGHT: 240 LBS | HEART RATE: 76 BPM

## 2024-02-21 DIAGNOSIS — Z13.6 ENCOUNTER FOR SCREENING FOR ABDOMINAL AORTIC ANEURYSM (AAA) IN PATIENT 50 YEARS OF AGE OR OLDER WITH HISTORY OF SMOKING: ICD-10-CM

## 2024-02-21 DIAGNOSIS — Z87.891 ENCOUNTER FOR SCREENING FOR ABDOMINAL AORTIC ANEURYSM (AAA) IN PATIENT 50 YEARS OF AGE OR OLDER WITH HISTORY OF SMOKING: ICD-10-CM

## 2024-02-21 DIAGNOSIS — I10 PRIMARY HYPERTENSION: ICD-10-CM

## 2024-02-21 PROCEDURE — 3079F DIAST BP 80-89 MM HG: CPT | Performed by: NURSE PRACTITIONER

## 2024-02-21 PROCEDURE — 3074F SYST BP LT 130 MM HG: CPT | Performed by: NURSE PRACTITIONER

## 2024-02-21 PROCEDURE — 99213 OFFICE O/P EST LOW 20 MIN: CPT | Performed by: NURSE PRACTITIONER

## 2024-02-21 ASSESSMENT — PATIENT HEALTH QUESTIONNAIRE - PHQ9: CLINICAL INTERPRETATION OF PHQ2 SCORE: 0

## 2024-02-21 NOTE — PROGRESS NOTES
Subjective       CC:   Chief Complaint   Patient presents with    Follow-Up     BP        HPI:   Patient is a 64 y.o. established male patient with medical history listed below here today for evaluation and management of hypertension. Doing well today, no new concerns. He forgot to get labs done.     Problem   Primary Hypertension    Taking Coreg 12.5mg BID         Patient Active Problem List   Diagnosis    Atrial fibrillation (HCC)    Primary hypertension    Mild intermittent asthma without complication    Obesity (BMI 30-39.9)       Past Medical History:   Diagnosis Date    ASTHMA     Atrial fibrillation (HCC) 4/4/2012    Benign hypertensive heart disease without heart failure 4/4/2012    Chest pain, unspecified 4/4/2012    Congestive heart failure (HCC)     afib, mi    Hypertension         Past Surgical History:   Procedure Laterality Date    OTHER ORTHOPEDIC SURGERY      shoulder        Current Outpatient Medications on File Prior to Visit   Medication Sig Dispense Refill    carvedilol (COREG) 12.5 MG Tab Take 1 Tablet by mouth 2 times a day with meals. 180 Tablet 3    albuterol 108 (90 Base) MCG/ACT Aero Soln inhalation aerosol Inhale 2 Puffs every 6 hours as needed for Shortness of Breath. 8.5 g 0    albuterol (PROVENTIL) 90 MCG/ACT AERS Inhale 2 Puffs by mouth every 6 hours as needed for Shortness of Breath. 1 Inhaler 0    ADVAIR HFA INH        No current facility-administered medications on file prior to visit.        Health Maintenance: Completed    ROS:  Review of Systems   Constitutional: Negative.  Negative for fever and malaise/fatigue.   HENT: Negative.     Eyes: Negative.    Respiratory:  Negative for cough, sputum production and shortness of breath.    Cardiovascular:  Negative for chest pain, palpitations and leg swelling.   Gastrointestinal: Negative.    Genitourinary: Negative.    Musculoskeletal: Negative.    Neurological: Negative.    Endo/Heme/Allergies: Negative.        Objective    "    Exam:  /82   Pulse 76   Temp 36.6 °C (97.8 °F) (Temporal)   Resp 16   Ht 1.803 m (5' 11\")   Wt 109 kg (240 lb)   SpO2 97%   BMI 33.47 kg/m²  Body mass index is 33.47 kg/m².    Physical Exam  Constitutional:       Appearance: Normal appearance.   Musculoskeletal:      Right lower leg: No edema.      Left lower leg: No edema.   Neurological:      Mental Status: He is alert.     Labs: reminded him to get labs done    Assessment & Plan       64 y.o. male with the following -     Problem List Items Addressed This Visit       Primary hypertension     Chronic since age early 50s; also with hx of afib and had been managed on Coreg 12.5mg BID for years. Lost insurance in 2022 and ran out of medication. He was noticing exertional dyspnea and told at dental office a few weeks ago BP elevated. Seen in our  on 6/28/2023 for this; EKG was NSR with left atrial enlargement. Was started on Metoprolol at , BP remained elevated at home and we switched him back to Coreg in 7/5/2023.  Tolerating well. BP in clinic today is 120/82; SBP at home in 120s. denies CP, palpitations, edema. He gets SOB when he runs or does strenous activity. Denies snoring. Recalls using inhaler as a child; needed albuterol 4x in February with smoke exposure. 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 Referred to for LDCT, got declined due to quit smoking > 15 years  - continue Coreg 12.5mg BID  - keep weekly BP log at home; bring in for review at next visit  - over due for annual labs; ordered at last visit; he will get these done after dental work.  - recommended AAA screen with his smoking history (17 pk yr; quit in 1994); he is open to this and order placed today. He will call to schedule when ready          Other Visit Diagnoses       Encounter for screening for abdominal aortic aneurysm (AAA) in patient 50 years of age or older with history of smoking        Relevant Orders    US-ABDOMINAL AORTA W/O DOPPLER      "     Educated in proper administration of medication(s) ordered today including safety, possible SE, risks, benefits, rationale and alternatives to therapy.     Return in about 3 months (around 5/21/2024) for Medicare Annual Visit.    Please note that this dictation was created using voice recognition software. I have made every reasonable attempt to correct obvious errors, but I expect that there are errors of grammar and possibly content that I did not discover before finalizing the note.

## 2024-02-21 NOTE — ASSESSMENT & PLAN NOTE
Chronic since age early 50s; also with hx of afib and had been managed on Coreg 12.5mg BID for years. Lost insurance in 2022 and ran out of medication. He was noticing exertional dyspnea and told at dental office a few weeks ago BP elevated. Seen in our UC on 6/28/2023 for this; EKG was NSR with left atrial enlargement. Was started on Metoprolol at , BP remained elevated at home and we switched him back to Coreg in 7/5/2023.  Tolerating well. BP in clinic today is 120/82; SBP at home in 120s. denies CP, palpitations, edema. He gets SOB when he runs or does strenous activity. Denies snoring. Recalls using inhaler as a child; needed albuterol 4x in February with smoke exposure. 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 Referred to for LDCT, got declined due to quit smoking > 15 years  - continue Coreg 12.5mg BID  - keep weekly BP log at home; bring in for review at next visit  - over due for annual labs; ordered at last visit; he will get these done after dental work.  - recommended AAA screen with his smoking history (17 pk yr; quit in 1994); he is open to this and order placed today. He will call to schedule when ready

## 2024-02-25 ASSESSMENT — ENCOUNTER SYMPTOMS
CONSTITUTIONAL NEGATIVE: 1
PALPITATIONS: 0
NEUROLOGICAL NEGATIVE: 1
SHORTNESS OF BREATH: 0
SPUTUM PRODUCTION: 0
MUSCULOSKELETAL NEGATIVE: 1
COUGH: 0
EYES NEGATIVE: 1
FEVER: 0
GASTROINTESTINAL NEGATIVE: 1

## 2024-04-04 ENCOUNTER — HOSPITAL ENCOUNTER (OUTPATIENT)
Dept: LAB | Facility: MEDICAL CENTER | Age: 64
End: 2024-04-04
Attending: NURSE PRACTITIONER
Payer: MEDICARE

## 2024-04-04 DIAGNOSIS — Z13.228 SCREENING FOR METABOLIC DISORDER: ICD-10-CM

## 2024-04-04 DIAGNOSIS — I10 PRIMARY HYPERTENSION: ICD-10-CM

## 2024-04-04 DIAGNOSIS — Z12.5 PROSTATE CANCER SCREENING: ICD-10-CM

## 2024-04-04 DIAGNOSIS — Z11.59 NEED FOR HEPATITIS C SCREENING TEST: ICD-10-CM

## 2024-04-04 LAB
ALBUMIN SERPL BCP-MCNC: 4.6 G/DL (ref 3.2–4.9)
ALBUMIN/GLOB SERPL: 1.8 G/DL
ALP SERPL-CCNC: 49 U/L (ref 30–99)
ALT SERPL-CCNC: 18 U/L (ref 2–50)
ANION GAP SERPL CALC-SCNC: 10 MMOL/L (ref 7–16)
AST SERPL-CCNC: 12 U/L (ref 12–45)
BILIRUB SERPL-MCNC: 0.8 MG/DL (ref 0.1–1.5)
BUN SERPL-MCNC: 16 MG/DL (ref 8–22)
CALCIUM ALBUM COR SERPL-MCNC: 9.8 MG/DL (ref 8.5–10.5)
CALCIUM SERPL-MCNC: 10.3 MG/DL (ref 8.5–10.5)
CHLORIDE SERPL-SCNC: 103 MMOL/L (ref 96–112)
CHOLEST SERPL-MCNC: 178 MG/DL (ref 100–199)
CO2 SERPL-SCNC: 26 MMOL/L (ref 20–33)
CREAT SERPL-MCNC: 0.94 MG/DL (ref 0.5–1.4)
EST. AVERAGE GLUCOSE BLD GHB EST-MCNC: 117 MG/DL
GFR SERPLBLD CREATININE-BSD FMLA CKD-EPI: 90 ML/MIN/1.73 M 2
GLOBULIN SER CALC-MCNC: 2.6 G/DL (ref 1.9–3.5)
GLUCOSE SERPL-MCNC: 119 MG/DL (ref 65–99)
HBA1C MFR BLD: 5.7 % (ref 4–5.6)
HDLC SERPL-MCNC: 30 MG/DL
LDLC SERPL CALC-MCNC: 128 MG/DL
POTASSIUM SERPL-SCNC: 4.7 MMOL/L (ref 3.6–5.5)
PROT SERPL-MCNC: 7.2 G/DL (ref 6–8.2)
SODIUM SERPL-SCNC: 139 MMOL/L (ref 135–145)
TRIGL SERPL-MCNC: 101 MG/DL (ref 0–149)

## 2024-04-04 PROCEDURE — 86803 HEPATITIS C AB TEST: CPT

## 2024-04-04 PROCEDURE — 80053 COMPREHEN METABOLIC PANEL: CPT

## 2024-04-04 PROCEDURE — 84443 ASSAY THYROID STIM HORMONE: CPT

## 2024-04-04 PROCEDURE — 80061 LIPID PANEL: CPT

## 2024-04-04 PROCEDURE — 84153 ASSAY OF PSA TOTAL: CPT | Mod: GA

## 2024-04-04 PROCEDURE — 83036 HEMOGLOBIN GLYCOSYLATED A1C: CPT | Mod: GA

## 2024-04-04 PROCEDURE — 36415 COLL VENOUS BLD VENIPUNCTURE: CPT

## 2024-04-05 LAB
HCV AB SER QL: NORMAL
PSA SERPL-MCNC: 1.92 NG/ML (ref 0–4)
TSH SERPL DL<=0.005 MIU/L-ACNC: 1.83 UIU/ML (ref 0.38–5.33)

## 2024-05-22 ENCOUNTER — OFFICE VISIT (OUTPATIENT)
Dept: MEDICAL GROUP | Facility: PHYSICIAN GROUP | Age: 64
End: 2024-05-22
Payer: MEDICARE

## 2024-05-22 VITALS
RESPIRATION RATE: 16 BRPM | WEIGHT: 243 LBS | TEMPERATURE: 98 F | BODY MASS INDEX: 34.02 KG/M2 | OXYGEN SATURATION: 97 % | HEIGHT: 71 IN | DIASTOLIC BLOOD PRESSURE: 84 MMHG | SYSTOLIC BLOOD PRESSURE: 128 MMHG | HEART RATE: 72 BPM

## 2024-05-22 DIAGNOSIS — E66.9 OBESITY (BMI 30-39.9): ICD-10-CM

## 2024-05-22 DIAGNOSIS — Z00.00 ENCOUNTER FOR ANNUAL WELLNESS VISIT (AWV) IN MEDICARE PATIENT: ICD-10-CM

## 2024-05-22 DIAGNOSIS — I10 PRIMARY HYPERTENSION: ICD-10-CM

## 2024-05-22 DIAGNOSIS — I48.20 CHRONIC ATRIAL FIBRILLATION (HCC): ICD-10-CM

## 2024-05-22 DIAGNOSIS — I48.91 ATRIAL FIBRILLATION, UNSPECIFIED TYPE (HCC): ICD-10-CM

## 2024-05-22 DIAGNOSIS — J45.20 MILD INTERMITTENT ASTHMA WITHOUT COMPLICATION: ICD-10-CM

## 2024-05-22 PROCEDURE — 3074F SYST BP LT 130 MM HG: CPT | Performed by: NURSE PRACTITIONER

## 2024-05-22 PROCEDURE — 3079F DIAST BP 80-89 MM HG: CPT | Performed by: NURSE PRACTITIONER

## 2024-05-22 PROCEDURE — G0402 INITIAL PREVENTIVE EXAM: HCPCS | Performed by: NURSE PRACTITIONER

## 2024-05-22 RX ORDER — CARVEDILOL 12.5 MG/1
12.5 TABLET ORAL 2 TIMES DAILY WITH MEALS
Qty: 180 TABLET | Refills: 3 | Status: SHIPPED | OUTPATIENT
Start: 2024-05-22

## 2024-05-22 RX ORDER — ALBUTEROL SULFATE 90 UG/1
2 AEROSOL, METERED RESPIRATORY (INHALATION) EVERY 6 HOURS PRN
Qty: 8.5 G | Refills: 1 | Status: SHIPPED | OUTPATIENT
Start: 2024-05-22

## 2024-05-22 ASSESSMENT — ACTIVITIES OF DAILY LIVING (ADL): BATHING_REQUIRES_ASSISTANCE: 0

## 2024-05-22 ASSESSMENT — ENCOUNTER SYMPTOMS: GENERAL WELL-BEING: GOOD

## 2024-05-22 ASSESSMENT — PATIENT HEALTH QUESTIONNAIRE - PHQ9: CLINICAL INTERPRETATION OF PHQ2 SCORE: 0

## 2024-05-22 NOTE — ASSESSMENT & PLAN NOTE
Chronic since age early 50s; also with hx of afib and had been managed on Coreg 12.5mg BID for years. Lost insurance in 2022 and ran out of medication. He was noticing exertional dyspnea last year and told at dental office BP elevated. Seen in our UC on 6/28/2023 for this; EKG was NSR with left atrial enlargement. Was started on Metoprolol at , BP remained elevated at home and we switched him back to Coreg in 7/5/2023.  Tolerating well. BP in clinic today is 128/84; SBP at home in 120s. denies CP, palpitations, edema. He gets SOB when he runs or does strenous activity. Denies snoring. Recalls using inhaler as a child; needed albuterol 4x in 2/2024 with smoke exposure. 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 Referred to for LDCT, got declined due to quit smoking > 15 years  - continue Coreg 12.5mg BID  - keep weekly BP log at home; bring in for review at next visit  - over due for annual labs; ordered at last visit; he will get these done after dental work.  - recommended AAA screen with his smoking history (17 pk yr; quit in 1994); he is open to this and order placed today. He will call to schedule when ready

## 2024-05-22 NOTE — ASSESSMENT & PLAN NOTE
[facility] was told he had afib with hypertension years ago in his early 50s; had been on Coreg 12.5mg BID for years. Ran out of insurance and medication in 2022. Was restarted on metoprolol XL 100mg QD in  on 6/28/2023; BP remained high so we switched him back to Coreg on 7/5/2023;   Records from outside PCP requested; reviewed old ER notes from 2007; there is mention of him being admitted for asthma exacerbation and going into afib during that hospitalization. He converted back with IV cardizem;   echo in 2007- EF > 55%; Normal left ventricle systolic function.  Basilar septal and inferior hypokinesis.   2. Trace mitral regurgitation.   3. Trace pulmonic insufficiency.   4. Aortic valve sclerosis with no significant aortic insufficiency or aortic stenosis.   5. No evidence of pericardial effusion.  Also had NM stress test in 2007 which was normal.    EKG done in  on 6/18/2023 shows him in NSR with left atrial enlargement    - continue Coreg 12.5mg BID; tolerating better; /84, HR 72
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
## 2024-05-24 ENCOUNTER — DOCUMENTATION (OUTPATIENT)
Dept: HEALTH INFORMATION MANAGEMENT | Facility: OTHER | Age: 64
End: 2024-05-24
Payer: MEDICARE

## 2024-10-25 ENCOUNTER — TELEPHONE (OUTPATIENT)
Dept: HEALTH INFORMATION MANAGEMENT | Facility: OTHER | Age: 64
End: 2024-10-25
Payer: MEDICARE

## 2024-10-25 DIAGNOSIS — I10 PRIMARY HYPERTENSION: ICD-10-CM

## 2024-10-25 DIAGNOSIS — I48.20 CHRONIC ATRIAL FIBRILLATION (HCC): ICD-10-CM

## 2024-10-27 RX ORDER — CARVEDILOL 12.5 MG/1
12.5 TABLET ORAL 2 TIMES DAILY WITH MEALS
Qty: 180 TABLET | Refills: 0 | Status: SHIPPED | OUTPATIENT
Start: 2024-10-27

## 2025-01-20 ENCOUNTER — OFFICE VISIT (OUTPATIENT)
Dept: MEDICAL GROUP | Facility: MEDICAL CENTER | Age: 65
End: 2025-01-20
Attending: NURSE PRACTITIONER
Payer: MEDICARE

## 2025-01-20 VITALS
HEART RATE: 92 BPM | SYSTOLIC BLOOD PRESSURE: 158 MMHG | RESPIRATION RATE: 16 BRPM | TEMPERATURE: 96.8 F | HEIGHT: 72 IN | BODY MASS INDEX: 32.71 KG/M2 | WEIGHT: 241.5 LBS | DIASTOLIC BLOOD PRESSURE: 68 MMHG | OXYGEN SATURATION: 88 %

## 2025-01-20 DIAGNOSIS — I10 PRIMARY HYPERTENSION: ICD-10-CM

## 2025-01-20 DIAGNOSIS — Z13.228 SCREENING FOR METABOLIC DISORDER: ICD-10-CM

## 2025-01-20 DIAGNOSIS — E66.9 OBESITY (BMI 30-39.9): ICD-10-CM

## 2025-01-20 DIAGNOSIS — Z12.5 PROSTATE CANCER SCREENING: ICD-10-CM

## 2025-01-20 DIAGNOSIS — R79.9 ABNORMAL FINDING OF BLOOD CHEMISTRY, UNSPECIFIED: ICD-10-CM

## 2025-01-20 DIAGNOSIS — I48.20 CHRONIC ATRIAL FIBRILLATION (HCC): ICD-10-CM

## 2025-01-20 PROCEDURE — 99212 OFFICE O/P EST SF 10 MIN: CPT | Performed by: NURSE PRACTITIONER

## 2025-01-20 PROCEDURE — 99214 OFFICE O/P EST MOD 30 MIN: CPT | Performed by: NURSE PRACTITIONER

## 2025-01-20 PROCEDURE — 3078F DIAST BP <80 MM HG: CPT | Performed by: NURSE PRACTITIONER

## 2025-01-20 PROCEDURE — 3077F SYST BP >= 140 MM HG: CPT | Performed by: NURSE PRACTITIONER

## 2025-01-20 RX ORDER — CARVEDILOL 12.5 MG/1
12.5 TABLET ORAL 2 TIMES DAILY WITH MEALS
Qty: 180 TABLET | Refills: 0 | Status: SHIPPED | OUTPATIENT
Start: 2025-01-20

## 2025-01-20 ASSESSMENT — PATIENT HEALTH QUESTIONNAIRE - PHQ9: CLINICAL INTERPRETATION OF PHQ2 SCORE: 0

## 2025-01-31 ENCOUNTER — TELEPHONE (OUTPATIENT)
Dept: HEALTH INFORMATION MANAGEMENT | Facility: OTHER | Age: 65
End: 2025-01-31
Payer: MEDICARE

## 2025-03-31 ENCOUNTER — HOSPITAL ENCOUNTER (OUTPATIENT)
Dept: LAB | Facility: MEDICAL CENTER | Age: 65
End: 2025-03-31
Attending: NURSE PRACTITIONER
Payer: MEDICARE

## 2025-03-31 DIAGNOSIS — R79.9 ABNORMAL FINDING OF BLOOD CHEMISTRY, UNSPECIFIED: ICD-10-CM

## 2025-03-31 DIAGNOSIS — I48.20 CHRONIC ATRIAL FIBRILLATION (HCC): ICD-10-CM

## 2025-03-31 DIAGNOSIS — Z12.5 PROSTATE CANCER SCREENING: ICD-10-CM

## 2025-03-31 DIAGNOSIS — E66.9 OBESITY (BMI 30-39.9): ICD-10-CM

## 2025-03-31 DIAGNOSIS — Z13.228 SCREENING FOR METABOLIC DISORDER: ICD-10-CM

## 2025-03-31 LAB
ALBUMIN SERPL BCP-MCNC: 4.1 G/DL (ref 3.2–4.9)
ALBUMIN/GLOB SERPL: 1.6 G/DL
ALP SERPL-CCNC: 51 U/L (ref 30–99)
ALT SERPL-CCNC: 26 U/L (ref 2–50)
ANION GAP SERPL CALC-SCNC: 11 MMOL/L (ref 7–16)
AST SERPL-CCNC: 19 U/L (ref 12–45)
BILIRUB SERPL-MCNC: 0.8 MG/DL (ref 0.1–1.5)
BUN SERPL-MCNC: 14 MG/DL (ref 8–22)
CALCIUM ALBUM COR SERPL-MCNC: 9.7 MG/DL (ref 8.5–10.5)
CALCIUM SERPL-MCNC: 9.8 MG/DL (ref 8.5–10.5)
CHLORIDE SERPL-SCNC: 104 MMOL/L (ref 96–112)
CHOLEST SERPL-MCNC: 146 MG/DL (ref 100–199)
CO2 SERPL-SCNC: 24 MMOL/L (ref 20–33)
CREAT SERPL-MCNC: 0.96 MG/DL (ref 0.5–1.4)
EST. AVERAGE GLUCOSE BLD GHB EST-MCNC: 114 MG/DL
FASTING STATUS PATIENT QL REPORTED: NORMAL
GFR SERPLBLD CREATININE-BSD FMLA CKD-EPI: 87 ML/MIN/1.73 M 2
GLOBULIN SER CALC-MCNC: 2.6 G/DL (ref 1.9–3.5)
GLUCOSE SERPL-MCNC: 103 MG/DL (ref 65–99)
HBA1C MFR BLD: 5.6 % (ref 4–5.6)
HDLC SERPL-MCNC: 29 MG/DL
LDLC SERPL CALC-MCNC: 96 MG/DL
POTASSIUM SERPL-SCNC: 4.1 MMOL/L (ref 3.6–5.5)
PROT SERPL-MCNC: 6.7 G/DL (ref 6–8.2)
SODIUM SERPL-SCNC: 139 MMOL/L (ref 135–145)
T4 FREE SERPL-MCNC: 1.52 NG/DL (ref 0.93–1.7)
TRIGL SERPL-MCNC: 107 MG/DL (ref 0–149)
TSH SERPL-ACNC: 2.25 UIU/ML (ref 0.35–5.5)

## 2025-03-31 PROCEDURE — 80061 LIPID PANEL: CPT

## 2025-03-31 PROCEDURE — 36415 COLL VENOUS BLD VENIPUNCTURE: CPT

## 2025-03-31 PROCEDURE — 84443 ASSAY THYROID STIM HORMONE: CPT

## 2025-03-31 PROCEDURE — 84439 ASSAY OF FREE THYROXINE: CPT

## 2025-03-31 PROCEDURE — 83036 HEMOGLOBIN GLYCOSYLATED A1C: CPT | Mod: GA

## 2025-03-31 PROCEDURE — 80053 COMPREHEN METABOLIC PANEL: CPT

## 2025-04-30 DIAGNOSIS — I10 PRIMARY HYPERTENSION: ICD-10-CM

## 2025-04-30 DIAGNOSIS — I48.20 CHRONIC ATRIAL FIBRILLATION (HCC): ICD-10-CM

## 2025-04-30 RX ORDER — CARVEDILOL 12.5 MG/1
12.5 TABLET ORAL
Qty: 180 TABLET | Refills: 0 | Status: SHIPPED | OUTPATIENT
Start: 2025-04-30

## 2025-04-30 NOTE — TELEPHONE ENCOUNTER
Received request via: Pharmacy    Was the patient seen in the last year in this department? Yes    Does the patient have an active prescription (recently filled or refills available) for medication(s) requested? No    Pharmacy Name: [facility]    Does the patient have MCFP Plus and need 100-day supply? (This applies to ALL medications) Patient does not have SCP

## 2025-05-21 ENCOUNTER — RESULTS FOLLOW-UP (OUTPATIENT)
Dept: MEDICAL GROUP | Facility: MEDICAL CENTER | Age: 65
End: 2025-05-21

## 2025-07-02 ENCOUNTER — APPOINTMENT (OUTPATIENT)
Dept: MEDICAL GROUP | Facility: MEDICAL CENTER | Age: 65
End: 2025-07-02
Payer: MEDICARE

## 2025-07-02 VITALS
HEART RATE: 75 BPM | TEMPERATURE: 99 F | DIASTOLIC BLOOD PRESSURE: 62 MMHG | RESPIRATION RATE: 16 BRPM | WEIGHT: 241.8 LBS | HEIGHT: 71 IN | SYSTOLIC BLOOD PRESSURE: 122 MMHG | BODY MASS INDEX: 33.85 KG/M2 | OXYGEN SATURATION: 94 %

## 2025-07-02 DIAGNOSIS — I10 PRIMARY HYPERTENSION: ICD-10-CM

## 2025-07-02 DIAGNOSIS — M25.562 ACUTE PAIN OF LEFT KNEE: Primary | ICD-10-CM

## 2025-07-02 DIAGNOSIS — I48.20 CHRONIC ATRIAL FIBRILLATION (HCC): ICD-10-CM

## 2025-07-02 DIAGNOSIS — Z23 NEED FOR VACCINATION: ICD-10-CM

## 2025-07-02 DIAGNOSIS — J45.20 MILD INTERMITTENT ASTHMA WITHOUT COMPLICATION: ICD-10-CM

## 2025-07-02 PROCEDURE — 3074F SYST BP LT 130 MM HG: CPT | Performed by: NURSE PRACTITIONER

## 2025-07-02 PROCEDURE — 99213 OFFICE O/P EST LOW 20 MIN: CPT | Performed by: NURSE PRACTITIONER

## 2025-07-02 PROCEDURE — 3078F DIAST BP <80 MM HG: CPT | Performed by: NURSE PRACTITIONER

## 2025-07-02 PROCEDURE — 99214 OFFICE O/P EST MOD 30 MIN: CPT | Performed by: NURSE PRACTITIONER

## 2025-07-02 PROCEDURE — 90471 IMMUNIZATION ADMIN: CPT

## 2025-07-02 RX ORDER — CARVEDILOL 12.5 MG/1
12.5 TABLET ORAL 2 TIMES DAILY
Qty: 200 TABLET | Refills: 1 | Status: SHIPPED | OUTPATIENT
Start: 2025-07-02 | End: 2025-10-10

## 2025-07-02 RX ORDER — ALBUTEROL SULFATE 90 UG/1
2 INHALANT RESPIRATORY (INHALATION) EVERY 6 HOURS PRN
Qty: 8.5 G | Refills: 1 | Status: SHIPPED | OUTPATIENT
Start: 2025-07-02

## 2025-07-07 NOTE — PROGRESS NOTES
"Verbal consent was acquired by the patient to use [employer] ambient listening note generation during this visit     Chief Complaint   Patient presents with    Follow-Up       Subjective:     HPI:   History of Present Illness  The patient presents for a persistent cough, knee pain, and medication management.    He has been experiencing a persistent cough, which he attributes to allergies. The cough is present daily but does not worsen during the summer months.    He reports knee discomfort, which he believes may be related to an old football injury from his childhood. 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 Due to lack of health insurance at the time, he sought treatment at [facility]. He describes a burning sensation in his knee when walking. An x-ray was performed, and he was informed that the issue was related to his kneecap. His last visit to [facility] was approximately 4 to 5 years ago.    He is currently on a twice-daily regimen of blood pressure medication. He has expressed interest in receiving a tetanus vaccine during this visit. He has undergone extensive dental work, including bridges and crowns, due to the effects of smoking on his teeth. He quit smoking in 1991.    SOCIAL HISTORY  He has not smoked since 1991.        No problems updated.    ROS  See HPI     Allergies[1]    Current medicines (including changes today)  Current Medications[2]    Social History[3]    Patient Active Problem List    Diagnosis Date Noted    Mild intermittent asthma without complication 07/05/2023    Obesity (BMI 30-39.9) 07/05/2023    Atrial fibrillation (HCC) 04/04/2012    Primary hypertension 04/04/2012       Family History   Family history unknown: Yes          Objective:     /62 (BP Location: Left arm, Patient Position: Sitting, BP Cuff Size: Adult)   Pulse 75   Temp 37.2 °C (99 °F) (Temporal)   Resp 16   Ht 1.803 m (5' 11\")   Wt 110 kg (241 lb 12.8 oz)   " SpO2 94%  Body mass index is 33.72 kg/m².    Physical Exam:  Physical Exam  Vitals reviewed.   Constitutional:       General: He is awake.      Appearance: Normal appearance. He is well-developed.   HENT:      Head: Normocephalic.   Eyes:      Conjunctiva/sclera: Conjunctivae normal.   Cardiovascular:      Rate and Rhythm: Normal rate.   Pulmonary:      Effort: Pulmonary effort is normal. No respiratory distress.   Musculoskeletal:      Cervical back: Neck supple.   Skin:     General: Skin is warm and dry.   Neurological:      Mental Status: He is alert and oriented to person, place, and time.   Psychiatric:         Mood and Affect: Mood normal.         Behavior: Behavior normal. Behavior is cooperative.              Assessment and Plan:     The following treatment plan was discussed:    Problem List Items Addressed This Visit       Atrial fibrillation (HCC)    Relevant Medications    carvedilol (COREG) 12.5 MG Tab    Primary hypertension    Relevant Medications    carvedilol (COREG) 12.5 MG Tab    Mild intermittent asthma without complication    Relevant Medications    albuterol 108 (90 Base) MCG/ACT Aero Soln inhalation aerosol     Other Visit Diagnoses         Acute pain of left knee    -  Primary    Relevant Orders    Referral to Orthopedics    DX-KNEE 3 VIEWS Atraumatic Pain/Swelling/Deformity      Need for vaccination        Relevant Orders    Tdap =>8yo IM (Completed)            Assessment & Plan  1. Persistent cough.  - Reports a persistent cough occurring daily, potentially due to allergies.  - No new treatment discussed during this visit.  - No recent changes in symptoms or severity.  - Encouraged to use OTC allergy medication and to increase fluids to help with expectoration from sputum    2. Knee pain.  - Experiences knee pain described as a burning sensation when walking, attributed to an old injury from playing football and a subsequent incident five or six years ago.  -  x-ray ordered for further  evaluation.  - Referral to orthopedics made for further evaluation and management.  - No immediate medications or therapies prescribed for knee pain- can take OTC tylenol or ibuprofen as needed and ICE Rest as needed.    3. Medication management.  - Requested renewal for inhaler prescription due to expiration.  - Blood pressure medication is automatically renewed as long as the patient is seen at least once a year.  - New prescription for inhaler sent to pharmacy.  - Advised to see the provider at least once a year for prescription renewals.    4. Health maintenance.  - Overdue for tetanus vaccine since .  - Will receive tetanus vaccine today.  - No cost expected for the tetanus vaccine.  - Follow-up recommended in 6 months to 1 year.    Any change or worsening of signs or symptoms, patient encouraged to follow-up or report to emergency room for further evaluation. Patient verbalizes understanding and agrees.      PLEASE NOTE: This dictation was created using voice recognition software. I have made every reasonable attempt to correct obvious errors, but I expect that there are errors of grammar and possibly content that I did not discover before finalizing the note.       [1] No Known Allergies  [2]   Current Outpatient Medications   Medication Sig Dispense Refill    albuterol 108 (90 Base) MCG/ACT Aero Soln inhalation aerosol Inhale 2 Puffs every 6 hours as needed for Shortness of Breath. 8.5 g 1    carvedilol (COREG) 12.5 MG Tab Take 1 Tablet by mouth 2 times a day for 100 days. 200 Tablet 1     No current facility-administered medications for this visit.   [3]   Social History  Tobacco Use    Smoking status: Former     Current packs/day: 0.00     Average packs/day: 1 pack/day for 17.0 years (17.0 ttl pk-yrs)     Types: Cigarettes     Start date:      Quit date:      Years since quittin.5    Smokeless tobacco: Never   Vaping Use    Vaping status: Never Used   Substance Use Topics    Alcohol use:  No    Drug use: No

## 2025-07-09 NOTE — Clinical Note
REFERRAL APPROVAL NOTICE         Sent on July 9, 2025                   Eitan Braswell  [address]  [facility] [address]                   Dear Mr. Braswell,    After a careful review of the medical information and benefit coverage, [facility] has processed your referral. See below for additional details.    If applicable, you must be actively enrolled with your insurance for coverage of the authorized service. If you have any questions regarding your coverage, please contact your insurance directly.    REFERRAL INFORMATION   Referral #:  [id number]  Referred-To Department    Referred-By Provider:  Orthopedics    GASPER Fleming   [facility] (joint)      [address] [id number] [phone] [address] [place] [id number] [phone]    Referral Start Date:  07/02/2025  Referral End Date:   07/02/2026             SCHEDULING  If you do not already have an appointment, please call [phone] to make an appointment.     MORE INFORMATION  If you do not already have a [facility] account, sign up at: [email or web address]  You can access your medical information, make appointments, see lab results, billing information, and more.  If you have questions regarding this referral, please contact  the [facility] Referrals department at:             [phone]. Monday - Friday 8:00AM - 5:00PM.     Sincerely,    [facility]